# Patient Record
Sex: MALE
[De-identification: names, ages, dates, MRNs, and addresses within clinical notes are randomized per-mention and may not be internally consistent; named-entity substitution may affect disease eponyms.]

---

## 2022-03-30 PROBLEM — Z00.00 ENCOUNTER FOR PREVENTIVE HEALTH EXAMINATION: Status: ACTIVE | Noted: 2022-03-30

## 2022-03-31 ENCOUNTER — APPOINTMENT (OUTPATIENT)
Dept: CV DIAGNOSTICS | Facility: HOSPITAL | Age: 77
End: 2022-03-31

## 2024-09-13 ENCOUNTER — INPATIENT (INPATIENT)
Facility: HOSPITAL | Age: 79
LOS: 4 days | Discharge: HOME | End: 2024-09-18
Attending: INTERNAL MEDICINE | Admitting: INTERNAL MEDICINE
Payer: MEDICARE

## 2024-09-13 VITALS
SYSTOLIC BLOOD PRESSURE: 146 MMHG | RESPIRATION RATE: 18 BRPM | WEIGHT: 315 LBS | TEMPERATURE: 99 F | OXYGEN SATURATION: 96 % | DIASTOLIC BLOOD PRESSURE: 86 MMHG | HEART RATE: 86 BPM | HEIGHT: 70 IN

## 2024-09-13 LAB
ACETONE SERPL-MCNC: ABNORMAL
ALBUMIN SERPL ELPH-MCNC: 3.7 G/DL — SIGNIFICANT CHANGE UP (ref 3.3–5)
ALP SERPL-CCNC: 98 U/L — SIGNIFICANT CHANGE UP (ref 40–120)
ALT FLD-CCNC: 23 U/L — SIGNIFICANT CHANGE UP (ref 12–78)
ANION GAP SERPL CALC-SCNC: 10 MMOL/L — SIGNIFICANT CHANGE UP (ref 5–17)
APPEARANCE UR: CLEAR — SIGNIFICANT CHANGE UP
AST SERPL-CCNC: 18 U/L — SIGNIFICANT CHANGE UP (ref 15–37)
BACTERIA # UR AUTO: ABNORMAL /HPF
BASOPHILS # BLD AUTO: 0.02 K/UL — SIGNIFICANT CHANGE UP (ref 0–0.2)
BASOPHILS NFR BLD AUTO: 0.3 % — SIGNIFICANT CHANGE UP (ref 0–2)
BILIRUB SERPL-MCNC: 0.8 MG/DL — SIGNIFICANT CHANGE UP (ref 0.2–1.2)
BILIRUB UR-MCNC: NEGATIVE — SIGNIFICANT CHANGE UP
BUN SERPL-MCNC: 21 MG/DL — SIGNIFICANT CHANGE UP (ref 7–23)
CALCIUM SERPL-MCNC: 10.4 MG/DL — HIGH (ref 8.5–10.1)
CHLORIDE SERPL-SCNC: 92 MMOL/L — LOW (ref 96–108)
CO2 SERPL-SCNC: 26 MMOL/L — SIGNIFICANT CHANGE UP (ref 22–31)
COLOR SPEC: YELLOW — SIGNIFICANT CHANGE UP
CREAT SERPL-MCNC: 1.78 MG/DL — HIGH (ref 0.5–1.3)
DIFF PNL FLD: NEGATIVE — SIGNIFICANT CHANGE UP
EGFR: 39 ML/MIN/1.73M2 — LOW
EOSINOPHIL # BLD AUTO: 0.22 K/UL — SIGNIFICANT CHANGE UP (ref 0–0.5)
EOSINOPHIL NFR BLD AUTO: 2.8 % — SIGNIFICANT CHANGE UP (ref 0–6)
GLUCOSE SERPL-MCNC: 656 MG/DL — CRITICAL HIGH (ref 70–99)
GLUCOSE UR QL: >=1000 MG/DL
HCT VFR BLD CALC: 41.9 % — SIGNIFICANT CHANGE UP (ref 39–50)
HGB BLD-MCNC: 14.3 G/DL — SIGNIFICANT CHANGE UP (ref 13–17)
IMM GRANULOCYTES NFR BLD AUTO: 0.3 % — SIGNIFICANT CHANGE UP (ref 0–0.9)
KETONES UR-MCNC: 15 MG/DL
LEUKOCYTE ESTERASE UR-ACNC: NEGATIVE — SIGNIFICANT CHANGE UP
LYMPHOCYTES # BLD AUTO: 2.33 K/UL — SIGNIFICANT CHANGE UP (ref 1–3.3)
LYMPHOCYTES # BLD AUTO: 29.4 % — SIGNIFICANT CHANGE UP (ref 13–44)
MCHC RBC-ENTMCNC: 30 PG — SIGNIFICANT CHANGE UP (ref 27–34)
MCHC RBC-ENTMCNC: 34.1 G/DL — SIGNIFICANT CHANGE UP (ref 32–36)
MCV RBC AUTO: 88 FL — SIGNIFICANT CHANGE UP (ref 80–100)
MONOCYTES # BLD AUTO: 0.62 K/UL — SIGNIFICANT CHANGE UP (ref 0–0.9)
MONOCYTES NFR BLD AUTO: 7.8 % — SIGNIFICANT CHANGE UP (ref 2–14)
NEUTROPHILS # BLD AUTO: 4.71 K/UL — SIGNIFICANT CHANGE UP (ref 1.8–7.4)
NEUTROPHILS NFR BLD AUTO: 59.4 % — SIGNIFICANT CHANGE UP (ref 43–77)
NITRITE UR-MCNC: NEGATIVE — SIGNIFICANT CHANGE UP
NRBC # BLD: 0 /100 WBCS — SIGNIFICANT CHANGE UP (ref 0–0)
PH UR: 6 — SIGNIFICANT CHANGE UP (ref 5–8)
PLATELET # BLD AUTO: 142 K/UL — LOW (ref 150–400)
POTASSIUM SERPL-MCNC: 4.4 MMOL/L — SIGNIFICANT CHANGE UP (ref 3.5–5.3)
POTASSIUM SERPL-SCNC: 4.4 MMOL/L — SIGNIFICANT CHANGE UP (ref 3.5–5.3)
PROT SERPL-MCNC: 8.3 GM/DL — SIGNIFICANT CHANGE UP (ref 6–8.3)
PROT UR-MCNC: NEGATIVE MG/DL — SIGNIFICANT CHANGE UP
RBC # BLD: 4.76 M/UL — SIGNIFICANT CHANGE UP (ref 4.2–5.8)
RBC # FLD: 11.7 % — SIGNIFICANT CHANGE UP (ref 10.3–14.5)
RBC CASTS # UR COMP ASSIST: 1 /HPF — SIGNIFICANT CHANGE UP (ref 0–4)
SODIUM SERPL-SCNC: 128 MMOL/L — LOW (ref 135–145)
SP GR SPEC: >1.03 — HIGH (ref 1–1.03)
UROBILINOGEN FLD QL: 0.2 MG/DL — SIGNIFICANT CHANGE UP (ref 0.2–1)
WBC # BLD: 7.92 K/UL — SIGNIFICANT CHANGE UP (ref 3.8–10.5)
WBC # FLD AUTO: 7.92 K/UL — SIGNIFICANT CHANGE UP (ref 3.8–10.5)
WBC UR QL: 4 /HPF — SIGNIFICANT CHANGE UP (ref 0–5)

## 2024-09-13 PROCEDURE — 71045 X-RAY EXAM CHEST 1 VIEW: CPT | Mod: 26

## 2024-09-13 PROCEDURE — 99285 EMERGENCY DEPT VISIT HI MDM: CPT

## 2024-09-13 PROCEDURE — 93010 ELECTROCARDIOGRAM REPORT: CPT

## 2024-09-13 PROCEDURE — 99222 1ST HOSP IP/OBS MODERATE 55: CPT

## 2024-09-13 RX ORDER — INSULIN GLARGINE 100 [IU]/ML
15 INJECTION, SOLUTION SUBCUTANEOUS ONCE
Refills: 0 | Status: COMPLETED | OUTPATIENT
Start: 2024-09-13 | End: 2024-09-13

## 2024-09-13 RX ORDER — METFORMIN HYDROCHLORIDE 850 MG/1
0 TABLET, FILM COATED ORAL
Qty: 0 | Refills: 1 | DISCHARGE

## 2024-09-13 RX ORDER — INSULIN GLARGINE 100 [IU]/ML
10 INJECTION, SOLUTION SUBCUTANEOUS ONCE
Refills: 0 | Status: DISCONTINUED | OUTPATIENT
Start: 2024-09-13 | End: 2024-09-13

## 2024-09-13 RX ORDER — DEXTROSE 15 G/33 G
12.5 GEL IN PACKET (GRAM) ORAL ONCE
Refills: 0 | Status: DISCONTINUED | OUTPATIENT
Start: 2024-09-13 | End: 2024-09-18

## 2024-09-13 RX ORDER — POTASSIUM CHLORIDE 10 MEQ
40 TABLET, EXT RELEASE, PARTICLES/CRYSTALS ORAL ONCE
Refills: 0 | Status: COMPLETED | OUTPATIENT
Start: 2024-09-13 | End: 2024-09-13

## 2024-09-13 RX ORDER — GLUCAGON INJECTION, SOLUTION 1 MG/.2ML
1 INJECTION, SOLUTION SUBCUTANEOUS ONCE
Refills: 0 | Status: DISCONTINUED | OUTPATIENT
Start: 2024-09-13 | End: 2024-09-18

## 2024-09-13 RX ORDER — BUDESONIDE, GLYCOPYRROLATE, AND FORMOTEROL FUMARATE 160; 9; 4.8 UG/1; UG/1; UG/1
0 AEROSOL, METERED RESPIRATORY (INHALATION)
Qty: 0 | Refills: 2 | DISCHARGE

## 2024-09-13 RX ORDER — INSULIN GLARGINE 100 [IU]/ML
15 INJECTION, SOLUTION SUBCUTANEOUS AT BEDTIME
Refills: 0 | Status: DISCONTINUED | OUTPATIENT
Start: 2024-09-14 | End: 2024-09-14

## 2024-09-13 RX ORDER — DEXTROSE 15 G/33 G
25 GEL IN PACKET (GRAM) ORAL ONCE
Refills: 0 | Status: DISCONTINUED | OUTPATIENT
Start: 2024-09-13 | End: 2024-09-18

## 2024-09-13 RX ORDER — INSULIN GLARGINE 100 [IU]/ML
15 INJECTION, SOLUTION SUBCUTANEOUS AT BEDTIME
Refills: 0 | Status: DISCONTINUED | OUTPATIENT
Start: 2024-09-13 | End: 2024-09-13

## 2024-09-13 RX ORDER — POTASSIUM CHLORIDE 10 MEQ
0 TABLET, EXT RELEASE, PARTICLES/CRYSTALS ORAL
Qty: 0 | Refills: 1 | DISCHARGE

## 2024-09-13 RX ORDER — INDAPAMIDE 2.5 MG
0 TABLET ORAL
Qty: 0 | Refills: 1 | DISCHARGE

## 2024-09-13 RX ORDER — INSULIN REGULAR, HUMAN 100/ML (3)
10 INSULIN PEN (ML) SUBCUTANEOUS ONCE
Refills: 0 | Status: COMPLETED | OUTPATIENT
Start: 2024-09-13 | End: 2024-09-13

## 2024-09-13 RX ADMIN — Medication 1000 MILLILITER(S): at 19:32

## 2024-09-13 RX ADMIN — Medication 1000 MILLILITER(S): at 15:59

## 2024-09-13 RX ADMIN — INSULIN GLARGINE 15 UNIT(S): 100 INJECTION, SOLUTION SUBCUTANEOUS at 22:31

## 2024-09-13 RX ADMIN — Medication 40 MILLIEQUIVALENT(S): at 22:24

## 2024-09-13 RX ADMIN — Medication 6: at 22:32

## 2024-09-13 RX ADMIN — Medication 10 UNIT(S): at 17:05

## 2024-09-13 NOTE — ED PROVIDER NOTE - OBJECTIVE STATEMENT
79 y/o M with PMH HTN, DM presenting to the ED w/ hyperglycemia. Patient states he last check his blood sugar in May and it was normal. His last PMD visit was in February. Reports he was feeling unwell tonight so he took his blood sugar and it was >600. States he has been feeling weak with polyuria/polydipsia. He has also noticed some shortness of breath recently with exertion. No chest pain. No N/V, fever, or chills. No dysuria or hematuria.

## 2024-09-13 NOTE — ED PROVIDER NOTE - CLINICAL SUMMARY MEDICAL DECISION MAKING FREE TEXT BOX
77 y/o M with PMH HTN, DM presenting to the ED w/ hyperglycemia.   Vitals stable.  Patient is well appearing in NAD.  FS > 600.  Will r/o DKA vs HHS with labs. Bolus fluids. Likely needs insulin.    Labs w hyperglycemia. Attempted to dose insulin but BS >500 after 10u IVP regular insulin  Will admit for glucose control  Lantus dosed  Plan for admission

## 2024-09-13 NOTE — ED ADULT NURSE NOTE - ED STAT RN HANDOFF DETAILS
Report endorsed to hold RN Gisella Schneider on 1C. Safety checks completed this shift. Safety rounds completed hourly.  IV site intact . Medications administered as ordered with no signs/symptoms of adverse reactions. Fall and skin precautions in place. Any issues endorsed to hold RN for follow up. Awaiting bed assignment.

## 2024-09-13 NOTE — ED ADULT NURSE NOTE - OBJECTIVE STATEMENT
Patient is 78 years old male, alert & oriented x 4 complaining of high blood sugar. He verbalized, " I rarely check my sugar but I wasn't feeling well so I checked my sugar." (+) weakness, frequent urination and thirst. PMHX: DM2, HTN, Gout. Denies fever , cough and chest pain. (-) diarrhea and dysuria.

## 2024-09-13 NOTE — H&P ADULT - PROBLEM SELECTOR PLAN 1
- S/p 10U regular insulin IV in ED  - ISS + FS achs  - S/p 2L IVF bolus  - S/p KCl 40meq PO  - S/p 15U Lantus. Will continue it  - Maintenance IVF  - F/u A1c  - Endo consult in AM please - S/p 10U regular insulin IV in ED  - ISS + FS achs  - S/p 2L IVF bolus  - S/p KCl 40meq PO  - S/p 15U Lantus. Will continue it  - Maintenance IVF  - Mental status at baseline. No nausea or vomiting. Will start diet for the morning  - F/u A1c  - Endo consult in AM please - S/p 10U regular insulin IV in ED  - S/p 2L IVF bolus  - S/p KCl 40meq PO  - S/p 15U Lantus + IV insulin 10U more   - Will start 25U Lantus at night  - ISS + FS achs  - Maintenance IVF  - Mental status at baseline. No nausea or vomiting. Will start diet for the morning  - F/u A1c  - Endo consult in AM please

## 2024-09-13 NOTE — H&P ADULT - CONVERSATION DETAILS
Explained cardiopulmonary resuscitative measures including CPR, and invasive ventilation relating to artificial life support. He said he does not want to be hooked upto a machine. At the same time, he was not hospitalized for anything serious before so he did not consider this conversation in a serious manner. He thinks his wife will want to do everything for him if anything happens.   Most importantly, he does not make a decision at the heat of the moment. As a result, will not force patient to make this decision today. Patient is FULLL CODE.

## 2024-09-13 NOTE — H&P ADULT - ASSESSMENT
Patient is a 78M, uses walker, AAOx3, with PMHx of KANWAL (noncompliant to CPAP), DM, TIA, HTN, who comes in with high blood sugars.

## 2024-09-13 NOTE — H&P ADULT - NSHPADDITIONALINFOADULT_GEN_ALL_CORE
Direct patient care (interview and examination of patient), discussion with other providers, support staff and/or patient's family members, review of medical records, ordering diagnostic tests and analyzing results, and documentation.

## 2024-09-13 NOTE — ED ADULT TRIAGE NOTE - CHIEF COMPLAINT QUOTE
pt accompanied with wife c/o hyperglycemia read as high today at approx 30 mins ago. pt also c/o "sob, tired, generalized weakness". also c/o bilateral knee pain x 6 months. hx; dm, gout, htn, glaucoma

## 2024-09-13 NOTE — ED ADULT NURSE NOTE - ED STAT RN HANDOFF DETAILS 2
Handoff report received from AGUSTIN Vincent. Report given to 1C by AGUSTIN Vincent. Pt is stable at this time. Waiting for transport to transport pt upstairs

## 2024-09-13 NOTE — H&P ADULT - NSICDXPASTMEDICALHX_GEN_ALL_CORE_FT
PAST MEDICAL HISTORY:  DM2 (diabetes mellitus, type 2)     Glaucoma     HTN (hypertension)     TIA (transient ischemic attack)

## 2024-09-13 NOTE — H&P ADULT - HISTORY OF PRESENT ILLNESS
Patient is a 78M, uses walker, AAOx3, with PMHx of KANWAL (noncompliant to CPAP), DM, TIA, HTN, who comes in with high blood sugars. He has glucometer that he bought 6 months ago that he never used until today. He checked it today and the reading was "HIGH" two times. So he came into the hospital. He has been chronically having fatigue, polydipsia, and polyuria. He also has chronic dyspnea on exertion. He was given Breztri and also was put on CPAP machine which he does not use. He has not had any recent fever, headache, chest pain, abdominal pain, diarrhea, constipation, hematuria, or bloody stools. He uses a walker to assist with walking and sitting due to fatigue.

## 2024-09-13 NOTE — ED PROVIDER NOTE - PHYSICAL EXAMINATION
GENERAL: Awake, alert, NAD, obese  HEENT: NC/AT, moist mucous membranes  LUNGS: CTAB, no wheezes or crackles   CARDIAC: RRR, no m/r/g  ABDOMEN: Soft, non tender, non distended, no rebound, no guarding  BACK: No midline spinal tenderness, no CVA tenderness  EXT: No edema, no calf tenderness, no deformities.  NEURO: A&Ox3. Moving all extremities.  SKIN: Warm and dry. No rash.  PSYCH: Normal affect.

## 2024-09-13 NOTE — H&P ADULT - PROBLEM SELECTOR PLAN 4
- Former smoker  - Does not use CPAP machine he has at home  - Home Breztri - continue.  - Not sure about if he was ever diagnosed with COPD or not.

## 2024-09-13 NOTE — H&P ADULT - NSHPPHYSICALEXAM_GEN_ALL_CORE
GENERAL: NAD, pleasant  HEAD:  Atraumatic, Normocephalic  EYES:  conjunctiva and sclera clear  NECK: Supple, No JVD  CHEST/LUNG: Clear to auscultation bilaterally; No wheeze, rhonchi, rales  HEART: Regular rate and rhythm; No murmurs, rubs, or gallops  ABDOMEN: Soft, Nontender, Nondistended; Bowel sounds present  EXTREMITIES:  2+ Peripheral Pulses, No clubbing, cyanosis, or edema  PSYCH: AAOx3, non-focal  SKIN: No rashes or lesions

## 2024-09-13 NOTE — ED ADULT NURSE REASSESSMENT NOTE - NS ED NURSE REASSESS COMMENT FT1
Patient and family informed that he is NPO until blood glucose is controlled. Patient continues to eat and states he understands. FS repeated, still at 500s. Dr. Hong notified.

## 2024-09-14 DIAGNOSIS — H40.9 UNSPECIFIED GLAUCOMA: ICD-10-CM

## 2024-09-14 DIAGNOSIS — E78.5 HYPERLIPIDEMIA, UNSPECIFIED: ICD-10-CM

## 2024-09-14 DIAGNOSIS — Z29.9 ENCOUNTER FOR PROPHYLACTIC MEASURES, UNSPECIFIED: ICD-10-CM

## 2024-09-14 DIAGNOSIS — N17.9 ACUTE KIDNEY FAILURE, UNSPECIFIED: ICD-10-CM

## 2024-09-14 DIAGNOSIS — E11.65 TYPE 2 DIABETES MELLITUS WITH HYPERGLYCEMIA: ICD-10-CM

## 2024-09-14 DIAGNOSIS — G47.33 OBSTRUCTIVE SLEEP APNEA (ADULT) (PEDIATRIC): ICD-10-CM

## 2024-09-14 DIAGNOSIS — I10 ESSENTIAL (PRIMARY) HYPERTENSION: ICD-10-CM

## 2024-09-14 LAB
A1C WITH ESTIMATED AVERAGE GLUCOSE RESULT: 14.3 % — HIGH (ref 4–5.6)
ALBUMIN SERPL ELPH-MCNC: 3.2 G/DL — LOW (ref 3.3–5)
ALP SERPL-CCNC: 78 U/L — SIGNIFICANT CHANGE UP (ref 40–120)
ALT FLD-CCNC: 22 U/L — SIGNIFICANT CHANGE UP (ref 12–78)
ANION GAP SERPL CALC-SCNC: 5 MMOL/L — SIGNIFICANT CHANGE UP (ref 5–17)
ANION GAP SERPL CALC-SCNC: 9 MMOL/L — SIGNIFICANT CHANGE UP (ref 5–17)
AST SERPL-CCNC: 12 U/L — LOW (ref 15–37)
BILIRUB SERPL-MCNC: 0.7 MG/DL — SIGNIFICANT CHANGE UP (ref 0.2–1.2)
BUN SERPL-MCNC: 16 MG/DL — SIGNIFICANT CHANGE UP (ref 7–23)
BUN SERPL-MCNC: 17 MG/DL — SIGNIFICANT CHANGE UP (ref 7–23)
CALCIUM SERPL-MCNC: 10.3 MG/DL — HIGH (ref 8.5–10.1)
CALCIUM SERPL-MCNC: 10.3 MG/DL — HIGH (ref 8.5–10.1)
CHLORIDE SERPL-SCNC: 98 MMOL/L — SIGNIFICANT CHANGE UP (ref 96–108)
CHLORIDE SERPL-SCNC: 98 MMOL/L — SIGNIFICANT CHANGE UP (ref 96–108)
CO2 SERPL-SCNC: 29 MMOL/L — SIGNIFICANT CHANGE UP (ref 22–31)
CO2 SERPL-SCNC: 29 MMOL/L — SIGNIFICANT CHANGE UP (ref 22–31)
CREAT SERPL-MCNC: 1.37 MG/DL — HIGH (ref 0.5–1.3)
CREAT SERPL-MCNC: 1.38 MG/DL — HIGH (ref 0.5–1.3)
EGFR: 53 ML/MIN/1.73M2 — LOW
EGFR: 53 ML/MIN/1.73M2 — LOW
ESTIMATED AVERAGE GLUCOSE: 364 MG/DL — HIGH (ref 68–114)
GLUCOSE BLDC GLUCOMTR-MCNC: 291 MG/DL — HIGH (ref 70–99)
GLUCOSE BLDC GLUCOMTR-MCNC: 335 MG/DL — HIGH (ref 70–99)
GLUCOSE BLDC GLUCOMTR-MCNC: 376 MG/DL — HIGH (ref 70–99)
GLUCOSE BLDC GLUCOMTR-MCNC: 384 MG/DL — HIGH (ref 70–99)
GLUCOSE BLDC GLUCOMTR-MCNC: 395 MG/DL — HIGH (ref 70–99)
GLUCOSE BLDC GLUCOMTR-MCNC: 421 MG/DL — HIGH (ref 70–99)
GLUCOSE BLDC GLUCOMTR-MCNC: 483 MG/DL — CRITICAL HIGH (ref 70–99)
GLUCOSE BLDC GLUCOMTR-MCNC: 518 MG/DL — CRITICAL HIGH (ref 70–99)
GLUCOSE BLDC GLUCOMTR-MCNC: 590 MG/DL — CRITICAL HIGH (ref 70–99)
GLUCOSE SERPL-MCNC: 327 MG/DL — HIGH (ref 70–99)
GLUCOSE SERPL-MCNC: 370 MG/DL — HIGH (ref 70–99)
HCT VFR BLD CALC: 39.3 % — SIGNIFICANT CHANGE UP (ref 39–50)
HGB BLD-MCNC: 13.3 G/DL — SIGNIFICANT CHANGE UP (ref 13–17)
MCHC RBC-ENTMCNC: 30 PG — SIGNIFICANT CHANGE UP (ref 27–34)
MCHC RBC-ENTMCNC: 33.8 G/DL — SIGNIFICANT CHANGE UP (ref 32–36)
MCV RBC AUTO: 88.7 FL — SIGNIFICANT CHANGE UP (ref 80–100)
NRBC # BLD: 0 /100 WBCS — SIGNIFICANT CHANGE UP (ref 0–0)
OSMOLALITY SERPL: 319 MOSMOL/KG — HIGH (ref 280–301)
PLATELET # BLD AUTO: 132 K/UL — LOW (ref 150–400)
POTASSIUM SERPL-MCNC: 3.2 MMOL/L — LOW (ref 3.5–5.3)
POTASSIUM SERPL-MCNC: 4 MMOL/L — SIGNIFICANT CHANGE UP (ref 3.5–5.3)
POTASSIUM SERPL-SCNC: 3.2 MMOL/L — LOW (ref 3.5–5.3)
POTASSIUM SERPL-SCNC: 4 MMOL/L — SIGNIFICANT CHANGE UP (ref 3.5–5.3)
PROT SERPL-MCNC: 7.3 GM/DL — SIGNIFICANT CHANGE UP (ref 6–8.3)
RBC # BLD: 4.43 M/UL — SIGNIFICANT CHANGE UP (ref 4.2–5.8)
RBC # FLD: 11.8 % — SIGNIFICANT CHANGE UP (ref 10.3–14.5)
SODIUM SERPL-SCNC: 132 MMOL/L — LOW (ref 135–145)
SODIUM SERPL-SCNC: 136 MMOL/L — SIGNIFICANT CHANGE UP (ref 135–145)
WBC # BLD: 8.03 K/UL — SIGNIFICANT CHANGE UP (ref 3.8–10.5)
WBC # FLD AUTO: 8.03 K/UL — SIGNIFICANT CHANGE UP (ref 3.8–10.5)

## 2024-09-14 PROCEDURE — 99232 SBSQ HOSP IP/OBS MODERATE 35: CPT

## 2024-09-14 RX ORDER — INSULIN GLARGINE 100 [IU]/ML
25 INJECTION, SOLUTION SUBCUTANEOUS AT BEDTIME
Refills: 0 | Status: DISCONTINUED | OUTPATIENT
Start: 2024-09-14 | End: 2024-09-15

## 2024-09-14 RX ORDER — DEXTROSE 15 G/33 G
15 GEL IN PACKET (GRAM) ORAL ONCE
Refills: 0 | Status: DISCONTINUED | OUTPATIENT
Start: 2024-09-14 | End: 2024-09-18

## 2024-09-14 RX ORDER — HEPARIN SODIUM,BOVINE 1000/ML
5000 VIAL (ML) INJECTION EVERY 8 HOURS
Refills: 0 | Status: DISCONTINUED | OUTPATIENT
Start: 2024-09-14 | End: 2024-09-18

## 2024-09-14 RX ORDER — INSULIN REGULAR, HUMAN 100/ML (3)
10 INSULIN PEN (ML) SUBCUTANEOUS ONCE
Refills: 0 | Status: COMPLETED | OUTPATIENT
Start: 2024-09-14 | End: 2024-09-14

## 2024-09-14 RX ADMIN — Medication 5000 UNIT(S): at 08:29

## 2024-09-14 RX ADMIN — Medication 6 UNIT(S): at 17:18

## 2024-09-14 RX ADMIN — Medication 100 MILLILITER(S): at 05:41

## 2024-09-14 RX ADMIN — Medication 10 UNIT(S): at 02:11

## 2024-09-14 RX ADMIN — INSULIN GLARGINE 25 UNIT(S): 100 INJECTION, SOLUTION SUBCUTANEOUS at 22:58

## 2024-09-14 RX ADMIN — Medication 3: at 08:28

## 2024-09-14 RX ADMIN — Medication 3: at 22:58

## 2024-09-14 RX ADMIN — Medication 5: at 12:01

## 2024-09-14 RX ADMIN — Medication 4: at 17:18

## 2024-09-14 RX ADMIN — Medication 5000 UNIT(S): at 16:47

## 2024-09-14 RX ADMIN — Medication 5000 UNIT(S): at 22:58

## 2024-09-14 NOTE — PATIENT PROFILE ADULT - FALL HARM RISK - HARM RISK INTERVENTIONS

## 2024-09-15 LAB
A1C WITH ESTIMATED AVERAGE GLUCOSE RESULT: 14.8 % — HIGH (ref 4–5.6)
ANION GAP SERPL CALC-SCNC: 11 MMOL/L — SIGNIFICANT CHANGE UP (ref 5–17)
BASOPHILS # BLD AUTO: 0.02 K/UL — SIGNIFICANT CHANGE UP (ref 0–0.2)
BASOPHILS NFR BLD AUTO: 0.3 % — SIGNIFICANT CHANGE UP (ref 0–2)
BUN SERPL-MCNC: 14 MG/DL — SIGNIFICANT CHANGE UP (ref 7–23)
CALCIUM SERPL-MCNC: 9.9 MG/DL — SIGNIFICANT CHANGE UP (ref 8.5–10.1)
CHLORIDE SERPL-SCNC: 99 MMOL/L — SIGNIFICANT CHANGE UP (ref 96–108)
CO2 SERPL-SCNC: 26 MMOL/L — SIGNIFICANT CHANGE UP (ref 22–31)
CREAT SERPL-MCNC: 1.22 MG/DL — SIGNIFICANT CHANGE UP (ref 0.5–1.3)
CULTURE RESULTS: SIGNIFICANT CHANGE UP
EGFR: 61 ML/MIN/1.73M2 — SIGNIFICANT CHANGE UP
EOSINOPHIL # BLD AUTO: 0.28 K/UL — SIGNIFICANT CHANGE UP (ref 0–0.5)
EOSINOPHIL NFR BLD AUTO: 3.7 % — SIGNIFICANT CHANGE UP (ref 0–6)
ESTIMATED AVERAGE GLUCOSE: 378 MG/DL — HIGH (ref 68–114)
GLUCOSE BLDC GLUCOMTR-MCNC: 207 MG/DL — HIGH (ref 70–99)
GLUCOSE BLDC GLUCOMTR-MCNC: 280 MG/DL — HIGH (ref 70–99)
GLUCOSE BLDC GLUCOMTR-MCNC: 296 MG/DL — HIGH (ref 70–99)
GLUCOSE BLDC GLUCOMTR-MCNC: 311 MG/DL — HIGH (ref 70–99)
GLUCOSE SERPL-MCNC: 335 MG/DL — HIGH (ref 70–99)
HCT VFR BLD CALC: 39.4 % — SIGNIFICANT CHANGE UP (ref 39–50)
HGB BLD-MCNC: 13.2 G/DL — SIGNIFICANT CHANGE UP (ref 13–17)
IMM GRANULOCYTES NFR BLD AUTO: 0.3 % — SIGNIFICANT CHANGE UP (ref 0–0.9)
LYMPHOCYTES # BLD AUTO: 3.21 K/UL — SIGNIFICANT CHANGE UP (ref 1–3.3)
LYMPHOCYTES # BLD AUTO: 42.5 % — SIGNIFICANT CHANGE UP (ref 13–44)
MCHC RBC-ENTMCNC: 29.9 PG — SIGNIFICANT CHANGE UP (ref 27–34)
MCHC RBC-ENTMCNC: 33.5 G/DL — SIGNIFICANT CHANGE UP (ref 32–36)
MCV RBC AUTO: 89.3 FL — SIGNIFICANT CHANGE UP (ref 80–100)
MONOCYTES # BLD AUTO: 0.54 K/UL — SIGNIFICANT CHANGE UP (ref 0–0.9)
MONOCYTES NFR BLD AUTO: 7.1 % — SIGNIFICANT CHANGE UP (ref 2–14)
NEUTROPHILS # BLD AUTO: 3.49 K/UL — SIGNIFICANT CHANGE UP (ref 1.8–7.4)
NEUTROPHILS NFR BLD AUTO: 46.1 % — SIGNIFICANT CHANGE UP (ref 43–77)
NRBC # BLD: 0 /100 WBCS — SIGNIFICANT CHANGE UP (ref 0–0)
PLATELET # BLD AUTO: 117 K/UL — LOW (ref 150–400)
POTASSIUM SERPL-MCNC: 3.6 MMOL/L — SIGNIFICANT CHANGE UP (ref 3.5–5.3)
POTASSIUM SERPL-SCNC: 3.6 MMOL/L — SIGNIFICANT CHANGE UP (ref 3.5–5.3)
RBC # BLD: 4.41 M/UL — SIGNIFICANT CHANGE UP (ref 4.2–5.8)
RBC # FLD: 11.8 % — SIGNIFICANT CHANGE UP (ref 10.3–14.5)
SODIUM SERPL-SCNC: 136 MMOL/L — SIGNIFICANT CHANGE UP (ref 135–145)
SPECIMEN SOURCE: SIGNIFICANT CHANGE UP
WBC # BLD: 7.56 K/UL — SIGNIFICANT CHANGE UP (ref 3.8–10.5)
WBC # FLD AUTO: 7.56 K/UL — SIGNIFICANT CHANGE UP (ref 3.8–10.5)

## 2024-09-15 PROCEDURE — 99232 SBSQ HOSP IP/OBS MODERATE 35: CPT

## 2024-09-15 RX ORDER — INSULIN GLARGINE 100 [IU]/ML
30 INJECTION, SOLUTION SUBCUTANEOUS AT BEDTIME
Refills: 0 | Status: DISCONTINUED | OUTPATIENT
Start: 2024-09-15 | End: 2024-09-16

## 2024-09-15 RX ADMIN — Medication 8 UNIT(S): at 16:03

## 2024-09-15 RX ADMIN — Medication 3: at 11:25

## 2024-09-15 RX ADMIN — INSULIN GLARGINE 30 UNIT(S): 100 INJECTION, SOLUTION SUBCUTANEOUS at 22:28

## 2024-09-15 RX ADMIN — Medication 8 UNIT(S): at 11:26

## 2024-09-15 RX ADMIN — Medication 1: at 22:29

## 2024-09-15 RX ADMIN — Medication 5000 UNIT(S): at 22:29

## 2024-09-15 RX ADMIN — Medication 2: at 16:03

## 2024-09-15 RX ADMIN — Medication 5000 UNIT(S): at 14:00

## 2024-09-15 RX ADMIN — Medication 4: at 08:53

## 2024-09-15 RX ADMIN — Medication 6 UNIT(S): at 08:54

## 2024-09-15 RX ADMIN — Medication 5000 UNIT(S): at 06:19

## 2024-09-16 LAB
ANION GAP SERPL CALC-SCNC: 8 MMOL/L — SIGNIFICANT CHANGE UP (ref 5–17)
BUN SERPL-MCNC: 12 MG/DL — SIGNIFICANT CHANGE UP (ref 7–23)
CALCIUM SERPL-MCNC: 9.5 MG/DL — SIGNIFICANT CHANGE UP (ref 8.5–10.1)
CHLORIDE SERPL-SCNC: 97 MMOL/L — SIGNIFICANT CHANGE UP (ref 96–108)
CHOLEST SERPL-MCNC: 141 MG/DL — SIGNIFICANT CHANGE UP
CO2 SERPL-SCNC: 30 MMOL/L — SIGNIFICANT CHANGE UP (ref 22–31)
CREAT SERPL-MCNC: 1.24 MG/DL — SIGNIFICANT CHANGE UP (ref 0.5–1.3)
EGFR: 60 ML/MIN/1.73M2 — SIGNIFICANT CHANGE UP
GLUCOSE BLDC GLUCOMTR-MCNC: 223 MG/DL — HIGH (ref 70–99)
GLUCOSE BLDC GLUCOMTR-MCNC: 223 MG/DL — HIGH (ref 70–99)
GLUCOSE BLDC GLUCOMTR-MCNC: 258 MG/DL — HIGH (ref 70–99)
GLUCOSE BLDC GLUCOMTR-MCNC: 259 MG/DL — HIGH (ref 70–99)
GLUCOSE BLDC GLUCOMTR-MCNC: 333 MG/DL — HIGH (ref 70–99)
GLUCOSE SERPL-MCNC: 282 MG/DL — HIGH (ref 70–99)
HCT VFR BLD CALC: 41.7 % — SIGNIFICANT CHANGE UP (ref 39–50)
HDLC SERPL-MCNC: 59 MG/DL — SIGNIFICANT CHANGE UP
HGB BLD-MCNC: 14.2 G/DL — SIGNIFICANT CHANGE UP (ref 13–17)
LIPID PNL WITH DIRECT LDL SERPL: 67 MG/DL — SIGNIFICANT CHANGE UP
MCHC RBC-ENTMCNC: 30.2 PG — SIGNIFICANT CHANGE UP (ref 27–34)
MCHC RBC-ENTMCNC: 34.1 G/DL — SIGNIFICANT CHANGE UP (ref 32–36)
MCV RBC AUTO: 88.7 FL — SIGNIFICANT CHANGE UP (ref 80–100)
NON HDL CHOLESTEROL: 82 MG/DL — SIGNIFICANT CHANGE UP
NRBC # BLD: 0 /100 WBCS — SIGNIFICANT CHANGE UP (ref 0–0)
PLATELET # BLD AUTO: 131 K/UL — LOW (ref 150–400)
POTASSIUM SERPL-MCNC: 3.2 MMOL/L — LOW (ref 3.5–5.3)
POTASSIUM SERPL-SCNC: 3.2 MMOL/L — LOW (ref 3.5–5.3)
RBC # BLD: 4.7 M/UL — SIGNIFICANT CHANGE UP (ref 4.2–5.8)
RBC # FLD: 11.7 % — SIGNIFICANT CHANGE UP (ref 10.3–14.5)
SODIUM SERPL-SCNC: 135 MMOL/L — SIGNIFICANT CHANGE UP (ref 135–145)
TRIGL SERPL-MCNC: 79 MG/DL — SIGNIFICANT CHANGE UP
WBC # BLD: 7.29 K/UL — SIGNIFICANT CHANGE UP (ref 3.8–10.5)
WBC # FLD AUTO: 7.29 K/UL — SIGNIFICANT CHANGE UP (ref 3.8–10.5)

## 2024-09-16 PROCEDURE — 99233 SBSQ HOSP IP/OBS HIGH 50: CPT

## 2024-09-16 RX ORDER — INSULIN GLARGINE 100 [IU]/ML
30 INJECTION, SOLUTION SUBCUTANEOUS
Refills: 0 | Status: DISCONTINUED | OUTPATIENT
Start: 2024-09-16 | End: 2024-09-18

## 2024-09-16 RX ORDER — FLUTICASONE PROPIONATE AND SALMETEROL 250; 50 UG/1; UG/1
1 POWDER RESPIRATORY (INHALATION)
Refills: 0 | Status: DISCONTINUED | OUTPATIENT
Start: 2024-09-16 | End: 2024-09-18

## 2024-09-16 RX ORDER — POTASSIUM CHLORIDE 10 MEQ
40 TABLET, EXT RELEASE, PARTICLES/CRYSTALS ORAL EVERY 4 HOURS
Refills: 0 | Status: COMPLETED | OUTPATIENT
Start: 2024-09-16 | End: 2024-09-16

## 2024-09-16 RX ADMIN — Medication 3: at 11:33

## 2024-09-16 RX ADMIN — Medication 8 UNIT(S): at 16:08

## 2024-09-16 RX ADMIN — INSULIN GLARGINE 30 UNIT(S): 100 INJECTION, SOLUTION SUBCUTANEOUS at 22:07

## 2024-09-16 RX ADMIN — Medication 40 MILLIEQUIVALENT(S): at 15:50

## 2024-09-16 RX ADMIN — Medication 40 MILLIEQUIVALENT(S): at 18:16

## 2024-09-16 RX ADMIN — Medication 5000 UNIT(S): at 13:54

## 2024-09-16 RX ADMIN — Medication 5000 UNIT(S): at 22:07

## 2024-09-16 RX ADMIN — Medication 8 UNIT(S): at 11:33

## 2024-09-16 RX ADMIN — Medication 4: at 08:25

## 2024-09-16 RX ADMIN — Medication 8 UNIT(S): at 08:26

## 2024-09-16 RX ADMIN — FLUTICASONE PROPIONATE AND SALMETEROL 1 DOSE(S): 250; 50 POWDER RESPIRATORY (INHALATION) at 18:17

## 2024-09-16 RX ADMIN — Medication 2: at 16:08

## 2024-09-16 RX ADMIN — Medication 5000 UNIT(S): at 05:51

## 2024-09-16 RX ADMIN — INSULIN GLARGINE 30 UNIT(S): 100 INJECTION, SOLUTION SUBCUTANEOUS at 10:05

## 2024-09-16 NOTE — PHYSICAL THERAPY INITIAL EVALUATION ADULT - ADDITIONAL COMMENTS
Pt states he has DME at home but most of the time he does not use any if going outdoors he bring his rollator.

## 2024-09-16 NOTE — PHYSICAL THERAPY INITIAL EVALUATION ADULT - DIAGNOSIS, PT EVAL
Pt presents ability to performed tasks - bed mobility, transfers and ambulation without need of assistive device.

## 2024-09-16 NOTE — PHYSICAL THERAPY INITIAL EVALUATION ADULT - LIVES WITH, PROFILE
Pt states he lives with wife in a pvt house, has 4 steps with rails to enter the house, has 1 flight to bedroom.

## 2024-09-16 NOTE — PHYSICAL THERAPY INITIAL EVALUATION ADULT - PERTINENT HX OF CURRENT PROBLEM, REHAB EVAL
Telephone Encounter by Aubree La, RN at 11/27/18 03:53 PM     Author:  Aubree La RN Service:  (none) Author Type:  Registered Nurse     Filed:  11/27/18 04:00 PM Encounter Date:  11/27/2018 Status:  Signed     :  Aubree La RN (Registered Nurse)            Ana returned call after speaking with patient and Miriam will go to get TXM now at [facility].[BN1.1M]      Revision History        User Key Date/Time User Provider Type Action    > BN1.1 11/27/18 04:00 PM Aubree La, RN Registered Nurse Sign    M - Manual             Pt is admitted with dx Hyperglycemia. Pmhx: DM, TIA, HTN

## 2024-09-16 NOTE — PHYSICAL THERAPY INITIAL EVALUATION ADULT - FUNCTIONAL LIMITATIONS, PT EVAL
Patient Recommendations:  - Restart Bactrim single strength 400/80 mg daily.  - Will work up diarrhea for infection and consider changing medications.    Transplant Patient Information  Your Post Transplant Coordinator is: Giovanna Kovacs  For non urgent items, we encourage you to contact your coordinator/care team online via [employer]  You and your care team can also contact your transplant coordinator Monday - Friday, 8am - 5pm at [phone] (Option 2 to reach the coordinator or Option 4 to schedule an appointment).  After hours for urgent matters, please call [facility] at [phone].   no functional impairments noted, present function is his baseline

## 2024-09-17 LAB
GLUCOSE BLDC GLUCOMTR-MCNC: 155 MG/DL — HIGH (ref 70–99)
GLUCOSE BLDC GLUCOMTR-MCNC: 174 MG/DL — HIGH (ref 70–99)
GLUCOSE BLDC GLUCOMTR-MCNC: 215 MG/DL — HIGH (ref 70–99)
GLUCOSE BLDC GLUCOMTR-MCNC: 276 MG/DL — HIGH (ref 70–99)

## 2024-09-17 PROCEDURE — 99232 SBSQ HOSP IP/OBS MODERATE 35: CPT

## 2024-09-17 RX ADMIN — Medication 1: at 11:29

## 2024-09-17 RX ADMIN — Medication 2: at 16:25

## 2024-09-17 RX ADMIN — Medication 1: at 21:01

## 2024-09-17 RX ADMIN — FLUTICASONE PROPIONATE AND SALMETEROL 1 DOSE(S): 250; 50 POWDER RESPIRATORY (INHALATION) at 17:28

## 2024-09-17 RX ADMIN — Medication 5000 UNIT(S): at 05:22

## 2024-09-17 RX ADMIN — Medication 5000 UNIT(S): at 13:06

## 2024-09-17 RX ADMIN — Medication 8 UNIT(S): at 16:26

## 2024-09-17 RX ADMIN — FLUTICASONE PROPIONATE AND SALMETEROL 1 DOSE(S): 250; 50 POWDER RESPIRATORY (INHALATION) at 05:23

## 2024-09-17 RX ADMIN — Medication 8 UNIT(S): at 08:29

## 2024-09-17 RX ADMIN — Medication 5000 UNIT(S): at 21:02

## 2024-09-17 RX ADMIN — INSULIN GLARGINE 30 UNIT(S): 100 INJECTION, SOLUTION SUBCUTANEOUS at 08:29

## 2024-09-17 RX ADMIN — INSULIN GLARGINE 30 UNIT(S): 100 INJECTION, SOLUTION SUBCUTANEOUS at 21:04

## 2024-09-17 RX ADMIN — Medication 1: at 08:28

## 2024-09-17 RX ADMIN — Medication 8 UNIT(S): at 11:30

## 2024-09-17 NOTE — DIETITIAN INITIAL EVALUATION ADULT - NUTRITIONGOAL OUTCOME1
Pt to verbalize need for lifestyle change and blood glucose levels to approach 140-180mg/dL during LOS

## 2024-09-17 NOTE — DIETITIAN INITIAL EVALUATION ADULT - OTHER INFO
Denies difficulty chewing/swallowing. Reports unsure of weight changes; states  pounds ~ 6 months ago.  Pt with T2DM; reports Metformin PTA, however pt unclear if he was taking it daily. Pt was not self monitors blood glucose PTA. HbA1c 14.8% indicates poor blood glucose management.   Pt not amenable to nutrition education at this time. Discussed HbA1c level and Finger Sticks and goals. Pt requested handouts be left for his wife as she does cooking at home. Made aware RD remains available.

## 2024-09-17 NOTE — DIETITIAN INITIAL EVALUATION ADULT - NS FNS DIET ORDER
Diet, Regular:   Consistent Carbohydrate {Evening Snack}  DASH/TLC {Sodium & Cholesterol Restricted} (09-14-24 @ 01:10) [Active]

## 2024-09-17 NOTE — DIETITIAN INITIAL EVALUATION ADULT - PERTINENT LABORATORY DATA
09-16    135  |  97  |  12  ----------------------------<  282[H]  3.2[L]   |  30  |  1.24    Ca    9.5      16 Sep 2024 10:19  CAPILLARY BLOOD GLUCOSE  POCT Blood Glucose.: 155 mg/dL (17 Sep 2024 11:19)  POCT Blood Glucose.: 174 mg/dL (17 Sep 2024 08:08)  POCT Blood Glucose.: 223 mg/dL (16 Sep 2024 21:15)  POCT Blood Glucose.: 223 mg/dL (16 Sep 2024 16:02)  A1C with Estimated Average Glucose Result: 14.8 % (09-14-24 @ 05:20)  A1C with Estimated Average Glucose Result: 14.3 % (09-14-24 @ 02:20)

## 2024-09-17 NOTE — DIETITIAN INITIAL EVALUATION ADULT - PROBLEM SELECTOR PLAN 1
- S/p 10U regular insulin IV in ED  - S/p 2L IVF bolus  - S/p KCl 40meq PO  - S/p 15U Lantus + IV insulin 10U more   - Will start 25U Lantus at night  - ISS + FS achs  - Maintenance IVF  - Mental status at baseline. No nausea or vomiting. Will start diet for the morning  - F/u A1c  - Endo consult in AM please

## 2024-09-17 NOTE — DIETITIAN INITIAL EVALUATION ADULT - PERTINENT MEDS FT
MEDICATIONS  (STANDING):  dextrose 5%. 1000 milliLiter(s) (100 mL/Hr) IV Continuous <Continuous>  dextrose 50% Injectable 25 Gram(s) IV Push once  dextrose 50% Injectable 25 Gram(s) IV Push once  dextrose 50% Injectable 12.5 Gram(s) IV Push once  fluticasone propionate/ salmeterol 250-50 MICROgram(s) Diskus 1 Dose(s) Inhalation two times a day  glucagon  Injectable 1 milliGRAM(s) IntraMuscular once  heparin   Injectable 5000 Unit(s) SubCutaneous every 8 hours  insulin glargine Injectable (LANTUS) 30 Unit(s) SubCutaneous two times a day  insulin lispro (ADMELOG) corrective regimen sliding scale   SubCutaneous at bedtime  insulin lispro (ADMELOG) corrective regimen sliding scale   SubCutaneous three times a day before meals  insulin lispro Injectable (ADMELOG) 8 Unit(s) SubCutaneous three times a day before meals  lactated ringers. 1000 milliLiter(s) (100 mL/Hr) IV Continuous <Continuous>    MEDICATIONS  (PRN):  dextrose Oral Gel 15 Gram(s) Oral once PRN Blood Glucose LESS THAN 70 milliGRAM(s)/deciliter

## 2024-09-17 NOTE — DIETITIAN INITIAL EVALUATION ADULT - LITERATURE/VIDEOS GIVEN
1. Diabetes and Nutrition handout  2. Heart Healthy Consistent Carbohydrate Nutrition Therapy  3. Meal Planning with the Plate Method handout

## 2024-09-18 ENCOUNTER — TRANSCRIPTION ENCOUNTER (OUTPATIENT)
Age: 79
End: 2024-09-18

## 2024-09-18 VITALS
SYSTOLIC BLOOD PRESSURE: 126 MMHG | DIASTOLIC BLOOD PRESSURE: 84 MMHG | RESPIRATION RATE: 18 BRPM | TEMPERATURE: 97 F | HEART RATE: 74 BPM | OXYGEN SATURATION: 99 %

## 2024-09-18 LAB
GLUCOSE BLDC GLUCOMTR-MCNC: 199 MG/DL — HIGH (ref 70–99)
GLUCOSE BLDC GLUCOMTR-MCNC: 211 MG/DL — HIGH (ref 70–99)
GLUCOSE BLDC GLUCOMTR-MCNC: 234 MG/DL — HIGH (ref 70–99)

## 2024-09-18 PROCEDURE — 99239 HOSP IP/OBS DSCHRG MGMT >30: CPT

## 2024-09-18 RX ORDER — INSULIN DEGLUDEC INJECTION 100 U/ML
27 INJECTION, SOLUTION SUBCUTANEOUS
Qty: 5 | Refills: 0
Start: 2024-09-18

## 2024-09-18 RX ORDER — BENZOCAINE .06; .7 ML/1; ML/1
0 SWAB TOPICAL
Qty: 100 | Refills: 1
Start: 2024-09-18

## 2024-09-18 RX ORDER — INSULIN GLARGINE 100 [IU]/ML
35 INJECTION, SOLUTION SUBCUTANEOUS
Qty: 5 | Refills: 0
Start: 2024-09-18

## 2024-09-18 RX ORDER — INSULIN GLARGINE 100 [IU]/ML
35 INJECTION, SOLUTION SUBCUTANEOUS
Qty: 0 | Refills: 0 | DISCHARGE
Start: 2024-09-18

## 2024-09-18 RX ORDER — INSULIN GLARGINE 100 [IU]/ML
35 INJECTION, SOLUTION SUBCUTANEOUS
Refills: 0 | Status: DISCONTINUED | OUTPATIENT
Start: 2024-09-18 | End: 2024-09-18

## 2024-09-18 RX ADMIN — Medication 2: at 08:11

## 2024-09-18 RX ADMIN — Medication 1: at 16:22

## 2024-09-18 RX ADMIN — Medication 8 UNIT(S): at 16:24

## 2024-09-18 RX ADMIN — Medication 5000 UNIT(S): at 15:35

## 2024-09-18 RX ADMIN — Medication 5000 UNIT(S): at 05:18

## 2024-09-18 RX ADMIN — Medication 2: at 11:42

## 2024-09-18 RX ADMIN — FLUTICASONE PROPIONATE AND SALMETEROL 1 DOSE(S): 250; 50 POWDER RESPIRATORY (INHALATION) at 05:16

## 2024-09-18 RX ADMIN — Medication 8 UNIT(S): at 11:43

## 2024-09-18 RX ADMIN — Medication 8 UNIT(S): at 08:12

## 2024-09-18 RX ADMIN — INSULIN GLARGINE 30 UNIT(S): 100 INJECTION, SOLUTION SUBCUTANEOUS at 08:24

## 2024-09-18 NOTE — PROGRESS NOTE ADULT - NUTRITIONAL ASSESSMENT
This patient has been assessed with a concern for Malnutrition and has been determined to have a diagnosis/diagnoses of Morbid obesity (BMI > 40).    This patient is being managed with:   Diet Regular-  Consistent Carbohydrate {Evening Snack}  DASH/TLC {Sodium & Cholesterol Restricted}  Entered: Sep 14 2024  1:10AM  
This patient has been assessed with a concern for Malnutrition and has been determined to have a diagnosis/diagnoses of Morbid obesity (BMI > 40).    This patient is being managed with:   Diet Regular-  Consistent Carbohydrate {Evening Snack}  DASH/TLC {Sodium & Cholesterol Restricted}  Entered: Sep 14 2024  1:10AM

## 2024-09-18 NOTE — DISCHARGE NOTE PROVIDER - NSDCFUADDAPPT_GEN_ALL_CORE_FT
call to make an appointment with your PCP and endocrinologist APPTS ARE READY TO BE MADE: [X] YES    Best Family or Patient Contact (if needed):    Additional Information about above appointments (if needed):    1: Endocrinologist  2: Primary Care  3:     Other comments or requests:    APPTS ARE READY TO BE MADE: [X] YES    Best Family or Patient Contact (if needed):    Additional Information about above appointments (if needed):    1: Endocrinologist  2: Primary Care  3:     Other comments or requests:     Patient advised they did not want to proceed with scheduling appointments with the providers on their referrals. They will coordinate care on their own.

## 2024-09-18 NOTE — PROGRESS NOTE ADULT - PROBLEM SELECTOR PLAN 3
- Home med Indapamide 2.5mg
- Home med Indapamide 2.5mg, and possibly more, Please confirm med rec
- Home med Indapamide 2.5mg

## 2024-09-18 NOTE — PROGRESS NOTE ADULT - PROBLEM SELECTOR PROBLEM 2
BRODERICK (acute kidney injury)

## 2024-09-18 NOTE — PROGRESS NOTE ADULT - PROBLEM SELECTOR PLAN 4
- Former smoker  - Does not use CPAP machine he has at home  - Home Breztri. start on symbicort while hospitalized  - Not sure about if he was ever diagnosed with COPD or not. PFT outpatient
- Former smoker  - Does not use CPAP machine he has at home  - Home Breztri - continue.  - Not sure about if he was ever diagnosed with COPD or not.
- Former smoker  - Does not use CPAP machine he has at home  - Home Breztri. start on symbicort while hospitalized  - Not sure about if he was ever diagnosed with COPD or not. PFT outpatient
- Former smoker  - Does not use CPAP machine he has at home  - Home Breztri. start on symbicort while hospitalized  - Not sure about if he was ever diagnosed with COPD or not. PFT outpatient
- Former smoker  - Does not use CPAP machine he has at home  - Home Breztri - continue.  - Not sure about if he was ever diagnosed with COPD or not.

## 2024-09-18 NOTE — PROGRESS NOTE ADULT - PROBLEM SELECTOR PLAN 2
- BRODERICK vs BRODERICK on CKD  - S/p 2L IVF  - Avoid nephrotoxins  - F/u BMP
- improved  - S/p 2L IVF  - Avoid nephrotoxins
- improved  - S/p 2L IVF  - Avoid nephrotoxins
- BRODERICK  - S/p 2L IVF  - Avoid nephrotoxins  - improved
- improved  - S/p 2L IVF  - Avoid nephrotoxins

## 2024-09-18 NOTE — DISCHARGE NOTE PROVIDER - ATTENDING DISCHARGE PHYSICAL EXAMINATION:
GENERAL: NAD,  no increased WOB  HEAD:  Atraumatic, Normocephalic  EYES: EOMI, conjunctiva and sclera clear  ENMT: Moist mucous membranes  NECK: Supple, No JVD  NERVOUS SYSTEM:  Alert & Oriented  CHEST/LUNG: Clear to auscultation bilaterally; No rales, rhonchi, wheezing  HEART: Regular rate and rhythm  ABDOMEN: Soft, Nontender, Nondistended; Bowel sounds present  EXTREMITIES: No clubbing, cyanosis, calf tenderness or edema

## 2024-09-18 NOTE — PROGRESS NOTE ADULT - PROBLEM SELECTOR PLAN 1
increase lantus and humalog today  - ISS + FS achs  - Maintenance IVF  - Mental status at baseline. No nausea or vomiting.   -  A1c 14.7  -only on metformin at home, will begin insulin education today
- A1c 14.7  - c/w lantus to 30U BID  - c/w pre-meal humalog   - ISS + FS achs    - on metformin at home  - insulin education
- S/p 10U regular insulin IV in ED  - S/p 2L IVF bolus  - S/p KCl 40meq PO  - S/p 15U Lantus + IV insulin 10U more   - Will start 25U Lantus at night  - ISS + FS achs  - Maintenance IVF  - Mental status at baseline. No nausea or vomiting.   - F/u A1c  -only on metformin at home
- A1c 14.7  - c/w lantus to 35U BID.   - c/w pre-meal humalog   - ISS + FS achs    - on metformin at home  - insulin education
-  A1c 14.7  - increase lantus to 30U BID  - c/w pre-meal humalog   - ISS + FS achs    - on metformin at home  - insulin education

## 2024-09-18 NOTE — PROGRESS NOTE ADULT - PROBLEM SELECTOR PROBLEM 1
Uncontrolled diabetes mellitus with hyperglycemia

## 2024-09-18 NOTE — DISCHARGE NOTE NURSING/CASE MANAGEMENT/SOCIAL WORK - NSDCPEFALRISK_GEN_ALL_CORE
For information on Fall & Injury Prevention, visit: [email or web address] OR  [email or web address] OR  https://www.cdc.gov/steadi/patient.html

## 2024-09-18 NOTE — DISCHARGE NOTE PROVIDER - NSFOLLOWUPCLINICS_GEN_ALL_ED_FT
[facility]  Endocrinology  [address] [place] [address]  Phone: [phone]  Fax: [phone]

## 2024-09-18 NOTE — DISCHARGE NOTE PROVIDER - DETAILS OF MALNUTRITION DIAGNOSIS/DIAGNOSES
This patient has been assessed with a concern for Malnutrition and was treated during this hospitalization for the following Nutrition diagnosis/diagnoses:     -  09/17/2024: Morbid obesity (BMI > 40)

## 2024-09-18 NOTE — DISCHARGE NOTE PROVIDER - HOSPITAL COURSE
Patient is a 78M, uses walker, AAOx3, with PMHx of KANWAL (noncompliant to CPAP), DM, TIA, HTN, who comes in with high blood sugars. He has glucometer that he bought 6 months ago that he never used until today. He checked it today and the reading was "HIGH" two times. So he came into the hospital. He has been chronically having fatigue, polydipsia, and polyuria. He also has chronic dyspnea on exertion. He was given Breztri and also was put on CPAP machine which he does not use. He has not had any recent fever, headache, chest pain, abdominal pain, diarrhea, constipation, hematuria, or bloody stools. He uses a walker to assist with walking and sitting due to fatigue. (13 Sep 2024 23:03)      A1C noted to be 14.7. Pt started on lantus and pre-meal insulin, adjusted throughout hospitalization for better glucose control. Diabetic education and insulin training provided. Pt to follow with PCP and endocrinologist outpt

## 2024-09-18 NOTE — PROGRESS NOTE ADULT - SUBJECTIVE AND OBJECTIVE BOX
PROGRESS NOTE:     Patient is a 78y old  Male who presents with a chief complaint of Hyperglycemia (15 Sep 2024 10:16)          SUBJECTIVE & OBJECTIVE:   Pt seen and examined at bedside in AM    no overnight events.       REVIEW OF SYSTEMS: remaining ROS negative     PHYSICAL EXAM:  VITALS:  Vital Signs Last 24 Hrs  T(C): 36.8 (16 Sep 2024 11:15), Max: 37.2 (15 Sep 2024 17:53)  T(F): 98.3 (16 Sep 2024 11:15), Max: 98.9 (15 Sep 2024 17:53)  HR: 86 (16 Sep 2024 11:15) (72 - 86)  BP: 132/90 (16 Sep 2024 11:15) (110/69 - 143/84)  BP(mean): --  RR: 18 (16 Sep 2024 11:15) (18 - 20)  SpO2: 97% (16 Sep 2024 11:15) (96% - 99%)    Parameters below as of 16 Sep 2024 11:15  Patient On (Oxygen Delivery Method): room air          GENERAL: NAD,  no increased WOB  HEAD:  Atraumatic, Normocephalic  EYES: EOMI, conjunctiva and sclera clear  ENMT: Moist mucous membranes  NECK: Supple, No JVD  NERVOUS SYSTEM:  Alert & Oriented  CHEST/LUNG: Clear to auscultation bilaterally; No rales, rhonchi, wheezing  HEART: Regular rate and rhythm  ABDOMEN: Soft, Nontender, Nondistended; Bowel sounds present  EXTREMITIES: No clubbing, cyanosis, calf tenderness or edema b/l      MEDICATIONS  (STANDING):  dextrose 5%. 1000 milliLiter(s) (100 mL/Hr) IV Continuous <Continuous>  dextrose 50% Injectable 12.5 Gram(s) IV Push once  dextrose 50% Injectable 25 Gram(s) IV Push once  dextrose 50% Injectable 25 Gram(s) IV Push once  glucagon  Injectable 1 milliGRAM(s) IntraMuscular once  heparin   Injectable 5000 Unit(s) SubCutaneous every 8 hours  insulin glargine Injectable (LANTUS) 30 Unit(s) SubCutaneous two times a day  insulin lispro (ADMELOG) corrective regimen sliding scale   SubCutaneous at bedtime  insulin lispro (ADMELOG) corrective regimen sliding scale   SubCutaneous three times a day before meals  insulin lispro Injectable (ADMELOG) 8 Unit(s) SubCutaneous three times a day before meals  lactated ringers. 1000 milliLiter(s) (100 mL/Hr) IV Continuous <Continuous>  potassium chloride   Powder 40 milliEquivalent(s) Oral every 4 hours    MEDICATIONS  (PRN):  dextrose Oral Gel 15 Gram(s) Oral once PRN Blood Glucose LESS THAN 70 milliGRAM(s)/deciliter      Allergies    No Known Allergies    Intolerances              LABS:                           14.2   7.29  )-----------( 131      ( 16 Sep 2024 10:19 )             41.7     09-16    135  |  97  |  12  ----------------------------<  282<H>  3.2<L>   |  30  |  1.24    Ca    9.5      16 Sep 2024 10:19        Urinalysis Basic - ( 16 Sep 2024 10:19 )    Color: x / Appearance: x / SG: x / pH: x  Gluc: 282 mg/dL / Ketone: x  / Bili: x / Urobili: x   Blood: x / Protein: x / Nitrite: x   Leuk Esterase: x / RBC: x / WBC x   Sq Epi: x / Non Sq Epi: x / Bacteria: x      CAPILLARY BLOOD GLUCOSE      POCT Blood Glucose.: 259 mg/dL (16 Sep 2024 11:04)  POCT Blood Glucose.: 258 mg/dL (16 Sep 2024 09:55)  POCT Blood Glucose.: 333 mg/dL (16 Sep 2024 08:00)  POCT Blood Glucose.: 296 mg/dL (15 Sep 2024 22:15)  POCT Blood Glucose.: 207 mg/dL (15 Sep 2024 16:00)                  RECENT CULTURES:    Culture - Urine (collected 13 Sep 2024 18:13)  Source: Clean Catch Clean Catch (Midstream)  Final Report (15 Sep 2024 19:14):    <10,000 CFU/mL Normal Urogenital Addis      RADIOLOGY & ADDITIONAL TESTS:    
PROGRESS NOTE:     Patient is a 78y old  Male who presents with a chief complaint of Hyperglycemia (15 Sep 2024 10:16)          SUBJECTIVE & OBJECTIVE:   Pt seen and examined at bedside in AM. Pt again refused blood work  no overnight events.       REVIEW OF SYSTEMS: remaining ROS negative     PHYSICAL EXAM:  VITALS:  Vital Signs Last 24 Hrs  T(C): 37.1 (18 Sep 2024 10:52), Max: 37.1 (17 Sep 2024 23:30)  T(F): 98.7 (18 Sep 2024 10:52), Max: 98.8 (17 Sep 2024 23:30)  HR: 85 (18 Sep 2024 10:52) (75 - 88)  BP: 129/88 (18 Sep 2024 10:52) (109/73 - 133/85)  BP(mean): --  RR: 18 (18 Sep 2024 10:52) (18 - 18)  SpO2: 98% (18 Sep 2024 10:52) (94% - 98%)    Parameters below as of 18 Sep 2024 10:52  Patient On (Oxygen Delivery Method): room air          GENERAL: NAD,  no increased WOB  HEAD:  Atraumatic, Normocephalic  EYES: EOMI, conjunctiva and sclera clear  ENMT: Moist mucous membranes  NECK: Supple, No JVD  NERVOUS SYSTEM:  Alert & Oriented  CHEST/LUNG: Clear to auscultation bilaterally; No rales, rhonchi, wheezing  HEART: Regular rate and rhythm  ABDOMEN: Soft, Nontender, Nondistended; Bowel sounds present  EXTREMITIES: No clubbing, cyanosis, calf tenderness or edema b/l      MEDICATIONS  (STANDING):  dextrose 5%. 1000 milliLiter(s) (100 mL/Hr) IV Continuous <Continuous>  dextrose 50% Injectable 25 Gram(s) IV Push once  dextrose 50% Injectable 25 Gram(s) IV Push once  dextrose 50% Injectable 12.5 Gram(s) IV Push once  fluticasone propionate/ salmeterol 250-50 MICROgram(s) Diskus 1 Dose(s) Inhalation two times a day  glucagon  Injectable 1 milliGRAM(s) IntraMuscular once  heparin   Injectable 5000 Unit(s) SubCutaneous every 8 hours  insulin glargine Injectable (LANTUS) 35 Unit(s) SubCutaneous two times a day  insulin lispro (ADMELOG) corrective regimen sliding scale   SubCutaneous at bedtime  insulin lispro (ADMELOG) corrective regimen sliding scale   SubCutaneous three times a day before meals  insulin lispro Injectable (ADMELOG) 8 Unit(s) SubCutaneous three times a day before meals  lactated ringers. 1000 milliLiter(s) (100 mL/Hr) IV Continuous <Continuous>    MEDICATIONS  (PRN):  dextrose Oral Gel 15 Gram(s) Oral once PRN Blood Glucose LESS THAN 70 milliGRAM(s)/deciliter          Allergies    No Known Allergies    Intolerances              LABS:                           14.2   7.29  )-----------( 131      ( 16 Sep 2024 10:19 )             41.7     09-16    135  |  97  |  12  ----------------------------<  282[H]  3.2[L]   |  30  |  1.24    Ca    9.5      16 Sep 2024 10:19          Urinalysis Basic - ( 16 Sep 2024 10:19 )    Color: x / Appearance: x / SG: x / pH: x  Gluc: 282 mg/dL / Ketone: x  / Bili: x / Urobili: x   Blood: x / Protein: x / Nitrite: x   Leuk Esterase: x / RBC: x / WBC x   Sq Epi: x / Non Sq Epi: x / Bacteria: x          CAPILLARY BLOOD GLUCOSE      POCT Blood Glucose.: 234 mg/dL (18 Sep 2024 11:09)  POCT Blood Glucose.: 211 mg/dL (18 Sep 2024 07:38)  POCT Blood Glucose.: 276 mg/dL (17 Sep 2024 20:41)  POCT Blood Glucose.: 215 mg/dL (17 Sep 2024 16:17)                    RECENT CULTURES:    Culture - Urine (collected 13 Sep 2024 18:13)  Source: Clean Catch Clean Catch (Midstream)  Final Report (15 Sep 2024 19:14):    <10,000 CFU/mL Normal Urogenital Addis      RADIOLOGY & ADDITIONAL TESTS:    
PROGRESS NOTE:     Patient is a 78y old  Male who presents with a chief complaint of Hyperglycemia (15 Sep 2024 10:16)          SUBJECTIVE & OBJECTIVE:   Pt seen and examined at bedside in AM. pt refusing blood work  no overnight events.       REVIEW OF SYSTEMS: remaining ROS negative     PHYSICAL EXAM:  VITALS:  Vital Signs Last 24 Hrs  T(C): 37.2 (17 Sep 2024 11:08), Max: 37.4 (16 Sep 2024 17:15)  T(F): 98.9 (17 Sep 2024 11:08), Max: 99.3 (16 Sep 2024 17:15)  HR: 78 (17 Sep 2024 11:08) (76 - 90)  BP: 139/84 (17 Sep 2024 11:08) (122/84 - 139/84)  BP(mean): --  RR: 17 (17 Sep 2024 11:08) (17 - 19)  SpO2: 98% (17 Sep 2024 11:08) (95% - 98%)    Parameters below as of 17 Sep 2024 11:08  Patient On (Oxygen Delivery Method): room air          GENERAL: NAD,  no increased WOB  HEAD:  Atraumatic, Normocephalic  EYES: EOMI, conjunctiva and sclera clear  ENMT: Moist mucous membranes  NECK: Supple, No JVD  NERVOUS SYSTEM:  Alert & Oriented  CHEST/LUNG: Clear to auscultation bilaterally; No rales, rhonchi, wheezing  HEART: Regular rate and rhythm  ABDOMEN: Soft, Nontender, Nondistended; Bowel sounds present  EXTREMITIES: No clubbing, cyanosis, calf tenderness or edema b/l      MEDICATIONS  (STANDING):  dextrose 5%. 1000 milliLiter(s) (100 mL/Hr) IV Continuous <Continuous>  dextrose 50% Injectable 12.5 Gram(s) IV Push once  dextrose 50% Injectable 25 Gram(s) IV Push once  dextrose 50% Injectable 25 Gram(s) IV Push once  fluticasone propionate/ salmeterol 250-50 MICROgram(s) Diskus 1 Dose(s) Inhalation two times a day  glucagon  Injectable 1 milliGRAM(s) IntraMuscular once  heparin   Injectable 5000 Unit(s) SubCutaneous every 8 hours  insulin glargine Injectable (LANTUS) 30 Unit(s) SubCutaneous two times a day  insulin lispro (ADMELOG) corrective regimen sliding scale   SubCutaneous at bedtime  insulin lispro (ADMELOG) corrective regimen sliding scale   SubCutaneous three times a day before meals  insulin lispro Injectable (ADMELOG) 8 Unit(s) SubCutaneous three times a day before meals  lactated ringers. 1000 milliLiter(s) (100 mL/Hr) IV Continuous <Continuous>    MEDICATIONS  (PRN):  dextrose Oral Gel 15 Gram(s) Oral once PRN Blood Glucose LESS THAN 70 milliGRAM(s)/deciliter        Allergies    No Known Allergies    Intolerances              LABS:                           14.2   7.29  )-----------( 131      ( 16 Sep 2024 10:19 )             41.7     09-16    135  |  97  |  12  ----------------------------<  282[H]  3.2[L]   |  30  |  1.24    Ca    9.5      16 Sep 2024 10:19          Urinalysis Basic - ( 16 Sep 2024 10:19 )    Color: x / Appearance: x / SG: x / pH: x  Gluc: 282 mg/dL / Ketone: x  / Bili: x / Urobili: x   Blood: x / Protein: x / Nitrite: x   Leuk Esterase: x / RBC: x / WBC x   Sq Epi: x / Non Sq Epi: x / Bacteria: x          CAPILLARY BLOOD GLUCOSE      POCT Blood Glucose.: 155 mg/dL (17 Sep 2024 11:19)  POCT Blood Glucose.: 174 mg/dL (17 Sep 2024 08:08)  POCT Blood Glucose.: 223 mg/dL (16 Sep 2024 21:15)  POCT Blood Glucose.: 223 mg/dL (16 Sep 2024 16:02)                  RECENT CULTURES:    Culture - Urine (collected 13 Sep 2024 18:13)  Source: Clean Catch Clean Catch (Midstream)  Final Report (15 Sep 2024 19:14):    <10,000 CFU/mL Normal Urogenital Addis      RADIOLOGY & ADDITIONAL TESTS:    
Patient is a 78y old  Male who presents with a chief complaint of Hyperglycemia (14 Sep 2024 11:05)    INTERVAL HPI/OVERNIGHT EVENTS:    MEDICATIONS  (STANDING):  dextrose 5%. 1000 milliLiter(s) (100 mL/Hr) IV Continuous <Continuous>  dextrose 50% Injectable 25 Gram(s) IV Push once  dextrose 50% Injectable 25 Gram(s) IV Push once  dextrose 50% Injectable 12.5 Gram(s) IV Push once  glucagon  Injectable 1 milliGRAM(s) IntraMuscular once  heparin   Injectable 5000 Unit(s) SubCutaneous every 8 hours  insulin glargine Injectable (LANTUS) 30 Unit(s) SubCutaneous at bedtime  insulin lispro (ADMELOG) corrective regimen sliding scale   SubCutaneous at bedtime  insulin lispro (ADMELOG) corrective regimen sliding scale   SubCutaneous three times a day before meals  insulin lispro Injectable (ADMELOG) 8 Unit(s) SubCutaneous three times a day before meals  lactated ringers. 1000 milliLiter(s) (100 mL/Hr) IV Continuous <Continuous>    MEDICATIONS  (PRN):  dextrose Oral Gel 15 Gram(s) Oral once PRN Blood Glucose LESS THAN 70 milliGRAM(s)/deciliter    Allergies    No Known Allergies    Intolerances      REVIEW OF SYSTEMS:  All other systems reviewed and are negative    Vital Signs Last 24 Hrs  T(C): 36.9 (15 Sep 2024 05:24), Max: 36.9 (15 Sep 2024 05:24)  T(F): 98.4 (15 Sep 2024 05:24), Max: 98.4 (15 Sep 2024 05:24)  HR: 83 (15 Sep 2024 05:24) (70 - 90)  BP: 149/68 (15 Sep 2024 05:24) (111/73 - 149/68)  BP(mean): --  RR: 17 (15 Sep 2024 05:24) (17 - 18)  SpO2: 98% (15 Sep 2024 05:24) (98% - 99%)    Parameters below as of 15 Sep 2024 05:24  Patient On (Oxygen Delivery Method): room air      Daily     Daily   I&O's Summary    CAPILLARY BLOOD GLUCOSE      POCT Blood Glucose.: 311 mg/dL (15 Sep 2024 08:01)  POCT Blood Glucose.: 395 mg/dL (14 Sep 2024 22:27)  POCT Blood Glucose.: 335 mg/dL (14 Sep 2024 16:49)  POCT Blood Glucose.: 384 mg/dL (14 Sep 2024 11:43)    PHYSICAL EXAM:  GENERAL: NAD,    HEAD:  Atraumatic, Normocephalic  EYES: EOMI, PERRLA, conjunctiva and sclera clear  ENMT: No tonsillar erythema, exudates, or enlargement; Moist mucous membranes, Good dentition, No lesions  NECK: Supple, No JVD, Normal thyroid  NERVOUS SYSTEM:  Alert & Oriented X3, Good concentration; Motor Strength 5/5 B/L upper and lower extremities; DTRs 2+ intact and symmetric  CHEST/LUNG: Clear to percussion bilaterally; No rales, rhonchi, wheezing, or rubs  HEART: Regular rate and rhythm; No murmurs, rubs, or gallops  ABDOMEN: Soft, Nontender, Nondistended; Bowel sounds present  EXTREMITIES:  2+ Peripheral Pulses, No clubbing, cyanosis, or edema  LYMPH: No lymphadenopathy noted  SKIN: No rashes or lesions    Labs                          13.2   7.56  )-----------( 117      ( 15 Sep 2024 06:15 )             39.4     09-15    136  |  99  |  14  ----------------------------<  335<H>  3.6   |  26  |  1.22    Ca    9.9      15 Sep 2024 06:15    TPro  7.3  /  Alb  3.2<L>  /  TBili  0.7  /  DBili  x   /  AST  12<L>  /  ALT  22  /  AlkPhos  78  09-14          Urinalysis Basic - ( 15 Sep 2024 06:15 )    Color: x / Appearance: x / SG: x / pH: x  Gluc: 335 mg/dL / Ketone: x  / Bili: x / Urobili: x   Blood: x / Protein: x / Nitrite: x   Leuk Esterase: x / RBC: x / WBC x   Sq Epi: x / Non Sq Epi: x / Bacteria: x                  DVT prophylaxis: > Lovenox 40mg SQ daily  > Heparin   > SCD's
Patient is a 78y old  Male who presents with a chief complaint of Hyperglycemia (13 Sep 2024 23:03)    INTERVAL HPI/OVERNIGHT EVENTS:    MEDICATIONS  (STANDING):  dextrose 5%. 1000 milliLiter(s) (100 mL/Hr) IV Continuous <Continuous>  dextrose 50% Injectable 12.5 Gram(s) IV Push once  dextrose 50% Injectable 25 Gram(s) IV Push once  dextrose 50% Injectable 25 Gram(s) IV Push once  glucagon  Injectable 1 milliGRAM(s) IntraMuscular once  heparin   Injectable 5000 Unit(s) SubCutaneous every 8 hours  insulin glargine Injectable (LANTUS) 25 Unit(s) SubCutaneous at bedtime  insulin lispro (ADMELOG) corrective regimen sliding scale   SubCutaneous at bedtime  insulin lispro (ADMELOG) corrective regimen sliding scale   SubCutaneous three times a day before meals  lactated ringers. 1000 milliLiter(s) (100 mL/Hr) IV Continuous <Continuous>    MEDICATIONS  (PRN):    Allergies    No Known Allergies    Intolerances      REVIEW OF SYSTEMS:  All other systems reviewed and are negative    Vital Signs Last 24 Hrs  T(C): 36.6 (14 Sep 2024 08:25), Max: 37.4 (13 Sep 2024 15:03)  T(F): 97.9 (14 Sep 2024 08:25), Max: 99.3 (13 Sep 2024 15:03)  HR: 68 (14 Sep 2024 08:25) (66 - 86)  BP: 142/79 (14 Sep 2024 08:25) (109/83 - 152/88)  BP(mean): --  RR: 18 (14 Sep 2024 08:25) (18 - 20)  SpO2: 95% (14 Sep 2024 08:25) (95% - 98%)    Parameters below as of 14 Sep 2024 08:25  Patient On (Oxygen Delivery Method): room air      Daily Height in cm: 177.8 (13 Sep 2024 15:03)    Daily   I&O's Summary    CAPILLARY BLOOD GLUCOSE      POCT Blood Glucose.: 291 mg/dL (14 Sep 2024 08:20)  POCT Blood Glucose.: 376 mg/dL (14 Sep 2024 03:25)  POCT Blood Glucose.: 518 mg/dL (14 Sep 2024 01:39)  POCT Blood Glucose.: 421 mg/dL (14 Sep 2024 01:37)  POCT Blood Glucose.: 590 mg/dL (14 Sep 2024 00:57)  POCT Blood Glucose.: 483 mg/dL (14 Sep 2024 00:55)  POCT Blood Glucose.: 494 mg/dL (13 Sep 2024 22:30)  POCT Blood Glucose.: 528 mg/dL (13 Sep 2024 20:52)  POCT Blood Glucose.: >600 mg/dL (13 Sep 2024 20:50)  POCT Blood Glucose.: 578 mg/dL (13 Sep 2024 17:42)  POCT Blood Glucose.: 551 mg/dL (13 Sep 2024 17:41)  POCT Blood Glucose.: >600 mg/dL (13 Sep 2024 15:15)  POCT Blood Glucose.: 573 mg/dL (13 Sep 2024 15:13)    PHYSICAL EXAM:  GENERAL: NAD,    HEAD:  Atraumatic, Normocephalic  EYES: EOMI, PERRLA, conjunctiva and sclera clear  ENMT: No tonsillar erythema, exudates, or enlargement; Moist mucous membranes, Good dentition, No lesions  NECK: Supple, No JVD, Normal thyroid  NERVOUS SYSTEM:  Alert & Oriented X3, Good concentration; Motor Strength 5/5 B/L upper and lower extremities; DTRs 2+ intact and symmetric  CHEST/LUNG: Clear to percussion bilaterally; No rales, rhonchi, wheezing, or rubs  HEART: Regular rate and rhythm; No murmurs, rubs, or gallops  ABDOMEN: Soft, Nontender, Nondistended; Bowel sounds present  EXTREMITIES:  2+ Peripheral Pulses, No clubbing, cyanosis, or edema  LYMPH: No lymphadenopathy noted  SKIN: No rashes or lesions    Labs                          13.3   8.03  )-----------( 132      ( 14 Sep 2024 05:20 )             39.3     09-14    136  |  98  |  16  ----------------------------<  327<H>  3.2<L>   |  29  |  1.38<H>    Ca    10.3<H>      14 Sep 2024 05:20    TPro  7.3  /  Alb  3.2<L>  /  TBili  0.7  /  DBili  x   /  AST  12<L>  /  ALT  22  /  AlkPhos  78  09-14          Urinalysis Basic - ( 14 Sep 2024 05:20 )    Color: x / Appearance: x / SG: x / pH: x  Gluc: 327 mg/dL / Ketone: x  / Bili: x / Urobili: x   Blood: x / Protein: x / Nitrite: x   Leuk Esterase: x / RBC: x / WBC x   Sq Epi: x / Non Sq Epi: x / Bacteria: x                  DVT prophylaxis: > Lovenox 40mg SQ daily  > Heparin   > SCD's

## 2024-09-18 NOTE — DISCHARGE NOTE NURSING/CASE MANAGEMENT/SOCIAL WORK - PATIENT PORTAL LINK FT
You can access the FollowMyHealth Patient Portal offered by [facility] by registering at the following website: [email or web address]. By joining [employer]’s FollowMyHealth portal, you will also be able to view your health information using other applications (apps) compatible with our system.

## 2024-09-18 NOTE — DISCHARGE NOTE PROVIDER - NSDCMRMEDTOKEN_GEN_ALL_CORE_FT
alcohol swabs: Apply topically to affected area 5 times a day  BREZTRI AEROSPHERE INHALER: TAKE 2 PUFFS BY MOUTH TWICE A DAY  Dexcom G7 : Use as directed  Dexcom G7 Sensors: Change every 10 days  HumaLOG KwikPen 100 units/mL injectable solution: 8 solution injectable 3 times a day before breakfast. lunch and dinner  INDAPAMIDE 2.5 MG TABLET: TAKE 1 TABLET BY MOUTH EVERY DAY  Insulin Pen Needles, 4mm: 1 application subcutaneously 5 times a day. ** Use with insulin pen **  KLOR-CON M10 TABLET: 1 TAB 3 DAYS WEEKLY ( SUNDAY , TUESDAY, FRIDAY OK )  Lantus Solostar Pen 100 units/mL subcutaneous solution: 35 subcutaneous 2 times a day  METFORMIN HCL  MG TABLET: TAKE 1 TABLET BY MOUTH TWICE A DAY   Admelog SoloStar 100 units/mL injectable solution: 8 unit(s) injectable 3 times a day (before meals) 8 unit(s) subcutaneous 3 times a day (with meals)  alcohol swabs: Apply topically to affected area 5 times a day  BREZTRI AEROSPHERE INHALER: TAKE 2 PUFFS BY MOUTH TWICE A DAY  Dexcom G7 : Use as directed  Dexcom G7 Sensors: Change every 10 days  INDAPAMIDE 2.5 MG TABLET: TAKE 1 TABLET BY MOUTH EVERY DAY  Insulin Pen Needles, 4mm: 1 application subcutaneously 5 times a day. ** Use with insulin pen **  KLOR-CON M10 TABLET: 1 TAB 3 DAYS WEEKLY ( SUNDAY , TUESDAY, FRIDAY OK )  METFORMIN HCL  MG TABLET: TAKE 1 TABLET BY MOUTH TWICE A DAY  Tresiba FlexTouch 100 units/mL subcutaneous solution: 27 unit(s) subcutaneous 2 times a day   Admelog SoloStar 100 units/mL injectable solution: 8 unit(s) injectable 3 times a day (before meals) 8 unit(s) subcutaneous 3 times a day (with meals)  alcohol swabs: Apply topically to affected area 5 times a day  BREZTRI AEROSPHERE INHALER: TAKE 2 PUFFS BY MOUTH TWICE A DAY  Freestyle Shashi 2 Bourg: Dispense 1 Bourg  Freestyle Shashi 2 Sensors: Apply 1 sensor every 14 days  Freestyle Precision Brandon Strips: Test blood sugar four times a day when you see check blood glucose symbol or when symptoms don&#x27;t match Shashi reading.  glucometer (per patient&#x27;s insurance): Test blood sugars four times a day. Dispense #1 glucometer.  INDAPAMIDE 2.5 MG TABLET: TAKE 1 TABLET BY MOUTH EVERY DAY  Insulin Pen Needles, 4mm: 1 application subcutaneously 5 times a day. ** Use with insulin pen **  KLOR-CON M10 TABLET: 1 TAB 3 DAYS WEEKLY ( SUNDAY , TUESDAY, FRIDAY OK )  lancets: 1 application subcutaneously 4 times a day  METFORMIN HCL  MG TABLET: TAKE 1 TABLET BY MOUTH TWICE A DAY  Tresiba FlexTouch 100 units/mL subcutaneous solution: 27 unit(s) subcutaneous 2 times a day   Admelog SoloStar 100 units/mL injectable solution: 8 unit(s) injectable 3 times a day (before meals) 8 unit(s) subcutaneous 3 times a day (with meals)  BREZTRI AEROSPHERE INHALER: TAKE 2 PUFFS BY MOUTH TWICE A DAY  INDAPAMIDE 2.5 MG TABLET: TAKE 1 TABLET BY MOUTH EVERY DAY  KLOR-CON M10 TABLET: 1 TAB 3 DAYS WEEKLY ( SUNDAY , TUESDAY, FRIDAY OK )  METFORMIN HCL  MG TABLET: TAKE 1 TABLET BY MOUTH TWICE A DAY  Tresiba FlexTouch 100 units/mL subcutaneous solution: 27 unit(s) subcutaneous 2 times a day

## 2024-09-23 DIAGNOSIS — E11.65 TYPE 2 DIABETES MELLITUS WITH HYPERGLYCEMIA: ICD-10-CM

## 2024-09-23 DIAGNOSIS — Z87.891 PERSONAL HISTORY OF NICOTINE DEPENDENCE: ICD-10-CM

## 2024-09-23 DIAGNOSIS — I10 ESSENTIAL (PRIMARY) HYPERTENSION: ICD-10-CM

## 2024-09-23 DIAGNOSIS — Z79.899 OTHER LONG TERM (CURRENT) DRUG THERAPY: ICD-10-CM

## 2024-09-23 DIAGNOSIS — G47.33 OBSTRUCTIVE SLEEP APNEA (ADULT) (PEDIATRIC): ICD-10-CM

## 2024-09-23 DIAGNOSIS — H40.9 UNSPECIFIED GLAUCOMA: ICD-10-CM

## 2024-09-23 DIAGNOSIS — N17.9 ACUTE KIDNEY FAILURE, UNSPECIFIED: ICD-10-CM

## 2024-09-23 DIAGNOSIS — E66.01 MORBID (SEVERE) OBESITY DUE TO EXCESS CALORIES: ICD-10-CM

## 2024-09-23 DIAGNOSIS — Z79.84 LONG TERM (CURRENT) USE OF ORAL HYPOGLYCEMIC DRUGS: ICD-10-CM

## 2024-09-23 DIAGNOSIS — Z86.73 PERSONAL HISTORY OF TRANSIENT ISCHEMIC ATTACK (TIA), AND CEREBRAL INFARCTION WITHOUT RESIDUAL DEFICITS: ICD-10-CM

## 2025-03-21 NOTE — DIETITIAN INITIAL EVALUATION ADULT - PROBLEM SELECTOR PLAN 5
Message sent to patient requesting update.   
- Please confirm med rec with patient's wife or pharmacy, and resume home eyedrop